# Patient Record
Sex: MALE | Race: ASIAN | NOT HISPANIC OR LATINO | URBAN - METROPOLITAN AREA
[De-identification: names, ages, dates, MRNs, and addresses within clinical notes are randomized per-mention and may not be internally consistent; named-entity substitution may affect disease eponyms.]

---

## 2020-07-23 ENCOUNTER — HOME CARE/HOSPICE - HEALTHEAST (OUTPATIENT)
Dept: HOME HEALTH SERVICES | Facility: HOME HEALTH | Age: 80
End: 2020-07-23

## 2020-07-24 ENCOUNTER — COMMUNICATION - HEALTHEAST (OUTPATIENT)
Dept: EMERGENCY MEDICINE | Facility: CLINIC | Age: 80
End: 2020-07-24

## 2021-06-16 PROBLEM — I63.9 STROKE OF UNKNOWN ETIOLOGY (H): Status: ACTIVE | Noted: 2020-07-22

## 2021-06-16 PROBLEM — I63.9 ACUTE CVA (CEREBROVASCULAR ACCIDENT) (H): Status: ACTIVE | Noted: 2020-07-22

## 2021-06-20 NOTE — LETTER
"Letter by Florinda Rodríguez RN at      Author: Florinda Rodríguez RN Service: -- Author Type: --    Filed:  Encounter Date: 7/24/2020 Status: (Other)       7/24/2020        Jose Guan  [address] [address] [place] [id number]    This letter provides a written record that you were tested for COVID-19 on 7/22/2020.     Daim ntawv no yog norma tseg qhia tias koj tau rakan kuaj tus kab mob COVID-19 lawm. Saib7/22/2020 (Hnub Valdez Ntsuam Sim) sab saum no.     Tau kuaj pom tias koj tsis muaj mob. Qhov no txhais tias peb nrhiav tsis pom tus kab mob uas tsim ua dayne muaj mob COVID-19 thaum kuaj koj. Muaj madina thaum rakan kuaj yuav qhia tias tsis muaj mob tabsis qhov tseeb tiag muaj tus kab mob lawm. Qhov no tshwm sim tau thaum uas tus kab mob tau nyuam qhuav kis tshiab lawm xwb, ua ntej koj yuav hnov muaj mob.    Yog tias koj muaj mob   Nyob twjywm hauv tsev thiab nyob kom nrug caroline ntawm lwm tus (cais nyob ib leeg) kom txog thaum koj ua tau raws li cov milla qhia TXHUA qhov hauv qab no:      Koj tsis ua npaws--thiab tsis tas yuav noj tshuaj los txo qhov ua npaws--tau 3 hnub txwm nkaus (72 teev). Thiab ?    Koj lwm noé mob khees zog lawm. Piv txwv, koj qhov hnoos lossis rakan ua pa nyuaj khees lawm. Thiab?    Yam tsawg kawg nkaus yog 10 hnub lawm txij thaum koj tau pib mob.    Ncua sijhawm no:    Nyob twjywm ntawm tsev xwb. Tsis txhob mus haujlwm, mus kawm ntawv lossis mus qhovtwg lucila.     Nyob twjywm hauv koj lub chav, txawm yuav noj mov los. Koj ib leeg nkaus xwb siv koj ib lub chav rafael yog ua tau.    Nyob kom nrug caroline ntawm lwm tus hauv koj tsev. Tsis txhob khawm, hnia lossis tuav yoanna. Tsis pub muaj qhua tuaj xyuas.    Tu cov chaw uas \"kov heev\" kelin gaytan (cov pob liaj qhov rooj, cov qaum rooj, cov yoanna tuav, thiab lwm yam.). Siv cov tshuaj tu tsev lossis madina ntaub so tov tshuaj ntxuav. Koj yuav pom cov tshuaj yus siv tau tag nrho li ntawm EPA lub vej xaij nyob ntawm " www.epa.gov/pesticide-registration/list-n-disinfectants-use-against-sars-cov-2.    Siv daim ntaub npog qhov ncauj qhov ntswg, ntawv this-suj lossis ntaub so los npog koj lub qhov ncauj thiab qhov ntswg alfred kom txhob kis kab mob tawm mus.    Muab rafael thiab xab npum ntxuav koj ob txhais yoanna tas li.    Yog yuav rov mus haujlwm  Nug koj tus lospav haujlwm alyson puas muaj rakan taw qhia yog yuav rov mus haujlwm.  Cov lospav haujlwm: Daim ntawv no yog ua daim ntawv ceeb toom ntawm kws allison mob qhia raws kevcai tias koj tus neeg ntiav ua haujlwm raug kuaj pom tias tsis mob COVID-19 lawm, raws li hnub kuaj norma saum taurus no.    English Translation    This letter provides a written record that you were tested for COVID-19. See 7/22/2020 (Date of Test) above.     Your result was negative. This means that we didnt find the virus that causes COVID-19 in your sample. A test may show negative when you do actually have the virus. This can happen when the virus is in the early stages of infection, before you feel illness symptoms.    If you have symptoms   Stay home and away from others (self-isolate) until you meet ALL of the guidelines below:      Youve had no fever--and no medicine that reduces fever--for 3 full days (72 hours). And ?    Your other symptoms have gotten better. For example, your cough or breathing has improved. And?    At least 10 days have passed since your symptoms started.    During this time:    Stay home. Dont go to work, school or anywhere else.     Stay in your own room, including for meals. Use your own bathroom if you can.    Stay away from others in your home. No hugging, kissing or shaking hands. No visitors.    Clean high touch surfaces often (doorknobs, counters, handles, etc.). Use a household cleaning spray or wipes. You can find a full list on the EPA website at www.epa.gov/pesticide-registration/list-n-disinfectants-use-against-sars-cov-2.    Cover your mouth and nose with a mask, tissue or washcloth to  avoid spreading germs.    Wash your hands and face often with soap and water.    Going back to work  Check with your employer for any guidelines to follow for going back to work.    Employers: This document serves as formal notice that your employee tested negative for COVID-19, as of the testing date shown above.

## 2021-06-20 NOTE — LETTER
Letter by Florinda Rodríguez RN at      Author: Florinda Rodríguez RN Service: -- Author Type: --    Filed:  Encounter Date: 7/24/2020 Status: (Other)       7/24/2020        Jose Guan  [address]  [place] [id number]    2019-nCOV   Date Value Ref Range Status   07/22/2020 Not Detected  Final     Comment:     Collection of multiple specimens from the same patient may be necessary to  detect the virus. The possibility of a false negative should be considered if  the patient's recent exposure or clinical presentation suggests 2019 nCOV  infection and diagnostic tests for other causes of illness are negative. Repeat  testing may be considered in this setting.  Viral RNA was extracted via a validated method and subsequently underwent  single step reverse transcriptase-real time polymerase chain reaction using  primers to the CDC specified N1,N2 gene targets of CoV2 and human RNP as an  internal control.  A negative result does not rule out the presence of real-time PCR inhibitors in  the specimen or COVID-19 RNA in concentrations below the limit of detection of  the assay. The possibility of a false negative should be considered if the  patients recent exposure or clinical presentation suggests COVID-19. Additional  testing or repeat testing requires consultation with the laboratory.  Nasopharyngeal specimen is the preferred choice for swab-based SARS CoV2  testing. When collection of a nasopharyngeal swab is not possible the following  are acceptable alternatives:  an oropharyngeal (OP) specimen collected by a healthcare professional, or a  nasal mid-turbinate (NMT) swab collected by a healthcare professional or by  onsite self-collection (using a flocked tapered swab), or an anterior nares  specimen collected by a healthcare professional or by onsite self-collection  (using a round foam swab). (Centers for Disease Control)  Testing performed by [facility], Room [address], [address]  [place] [address]. This test was developed and its  performance characteristics determined by the [facility]. It has not been cleared or approved by the FDA.  The laboratory is regulated under the Clinical Laboratory Improvement  Amendments of 1988 (CLIA-88) as qualified to perform high-complexity testing.  This test is used for clinical purposes. It should not be regarded as  investigational or for research.    Performed and/or entered by:  [address] [address]        No results found for: [id number]    This letter provides a written record that you were tested for COVID-19.    Your result was negative. This means that we didnt find the virus that causes COVID-19 in your sample. A test may show negative when you do actually have the virus. This can happen when the virus is in the early stages of infection, before you feel illness symptoms.    If you have symptoms   Stay home and away from others (self-isolate) until you meet ALL of the guidelines below:    Youve had no fever--and no medicine that reduces fever--for 3 full days (72 hours). And ?    Your other symptoms have gotten better. For example, your cough or breathing has improved. And?    At least 10 days have passed since your symptoms started.    During this time:    Stay home. Dont go to work, school or anywhere else.     Stay in your own room, including for meals. Use your own bathroom if you can.    Stay away from others in your home. No hugging, kissing or shaking hands. No visitors.    Clean high touch surfaces often (doorknobs, counters, handles, etc.). Use a household cleaning spray or wipes. You can find a full list on the EPA website at www.epa.gov/pesticide-registration/list-n-disinfectants-use-against-sars-cov-2.    Cover your mouth and nose with a mask, tissue or washcloth to avoid spreading germs.    Wash your hands and face often with soap  and water.    Going back to work  Check with your employer for any guidelines to follow for going back to work.    Employers: This document serves as formal notice that your employee tested negative for COVID-19, as of the testing date shown above.